# Patient Record
Sex: MALE | Race: WHITE | NOT HISPANIC OR LATINO | Employment: OTHER | URBAN - METROPOLITAN AREA
[De-identification: names, ages, dates, MRNs, and addresses within clinical notes are randomized per-mention and may not be internally consistent; named-entity substitution may affect disease eponyms.]

---

## 2017-12-18 ENCOUNTER — IMPORTED ENCOUNTER (OUTPATIENT)
Dept: URBAN - METROPOLITAN AREA CLINIC 43 | Facility: CLINIC | Age: 77
End: 2017-12-18

## 2017-12-18 PROBLEM — H25.13: Noted: 2017-12-18

## 2019-06-10 ENCOUNTER — IMPORTED ENCOUNTER (OUTPATIENT)
Dept: URBAN - METROPOLITAN AREA CLINIC 43 | Facility: CLINIC | Age: 79
End: 2019-06-10

## 2019-06-10 PROBLEM — H25.13: Noted: 2019-06-10

## 2019-06-10 PROBLEM — H11.123: Noted: 2019-06-10

## 2019-06-10 PROBLEM — H25.013: Noted: 2019-06-10

## 2019-06-10 PROBLEM — H25.12: Noted: 2019-06-10

## 2019-06-10 PROBLEM — H10.45: Noted: 2019-06-10

## 2019-06-10 PROBLEM — INACTIVE: Noted: 2019-06-10

## 2019-06-10 PROBLEM — H25.11: Noted: 2019-06-10

## 2019-06-17 ENCOUNTER — IMPORTED ENCOUNTER (OUTPATIENT)
Dept: URBAN - METROPOLITAN AREA CLINIC 43 | Facility: CLINIC | Age: 79
End: 2019-06-17

## 2020-04-18 ASSESSMENT — VISUAL ACUITY
OD_CC: J1+-1
OS_SC: J2
OS_OTHER: 20/40.
OS_CC: J1+-1
OS_SC: J1+
OS_SC: 20/30
OD_SC: J7
OS_SC: 20/50-1
OD_SC: 20/50-1
OD_SC: 20/70-1
OS_OTHER: <20/400.
OD_OTHER: <20/400.
OS_SC: 20/60-1
OD_OTHER: 20/40.
OS_PH: 20/40-2
OD_SC: 20/40
OD_SC: J2-1
OD_PH: 20/40-2

## 2020-04-18 ASSESSMENT — KERATOMETRY
OD_K2POWER_DIOPTERS: 44
OD_K2POWER_DIOPTERS: 43.5
OS_K2POWER_DIOPTERS: 44
OD_AXISANGLE2_DEGREES: 165
OD_K1POWER_DIOPTERS: 44.75
OS_K1POWER_DIOPTERS: 45.25
OS_K2POWER_DIOPTERS: 44.25
OD_AXISANGLE_DEGREES: 80
OS_K1POWER_DIOPTERS: 45.25
OS_AXISANGLE_DEGREES: 95
OS_AXISANGLE2_DEGREES: 10
OS_AXISANGLE2_DEGREES: 5
OD_AXISANGLE2_DEGREES: 170
OS_AXISANGLE_DEGREES: 100
OD_K1POWER_DIOPTERS: 45
OD_AXISANGLE_DEGREES: 75

## 2020-04-18 ASSESSMENT — TONOMETRY
OD_IOP_MMHG: 17.0
OS_IOP_MMHG: 19.0
OS_IOP_MMHG: 18.0
OD_IOP_MMHG: 20.0

## 2020-09-03 ENCOUNTER — PREPPED CHART (OUTPATIENT)
Dept: URBAN - METROPOLITAN AREA CLINIC 32 | Facility: CLINIC | Age: 80
End: 2020-09-03

## 2020-09-03 ASSESSMENT — KERATOMETRY
OD_K1POWER_DIOPTERS: 44.75
OS_K2POWER_DIOPTERS: 44
OS_AXISANGLE2_DEGREES: 5
OD_AXISANGLE_DEGREES: 75
OS_AXISANGLE_DEGREES: 95
OD_AXISANGLE2_DEGREES: 165
OD_K2POWER_DIOPTERS: 43.5
OS_K1POWER_DIOPTERS: 45.25

## 2020-09-09 ENCOUNTER — ESTABLISHED COMPREHENSIVE EXAM (OUTPATIENT)
Dept: URBAN - METROPOLITAN AREA CLINIC 32 | Facility: CLINIC | Age: 80
End: 2020-09-09

## 2020-09-09 DIAGNOSIS — H25.13: ICD-10-CM

## 2020-09-09 DIAGNOSIS — H25.043: ICD-10-CM

## 2020-09-09 DIAGNOSIS — H25.013: ICD-10-CM

## 2020-09-09 PROCEDURE — 92014 COMPRE OPH EXAM EST PT 1/>: CPT

## 2020-09-09 PROCEDURE — 92015 DETERMINE REFRACTIVE STATE: CPT

## 2020-09-09 ASSESSMENT — TONOMETRY
OD_IOP_MMHG: 12
OS_IOP_MMHG: 15

## 2020-09-09 ASSESSMENT — KERATOMETRY
OS_K2POWER_DIOPTERS: 44.25
OD_K2POWER_DIOPTERS: 43.75
OS_AXISANGLE2_DEGREES: 95
OD_AXISANGLE2_DEGREES: 71
OS_AXISANGLE_DEGREES: 5
OS_K1POWER_DIOPTERS: 45.5
OD_K1POWER_DIOPTERS: 45
OD_AXISANGLE_DEGREES: 161

## 2020-09-09 ASSESSMENT — VISUAL ACUITY
OS_CC: J3
OS_SC: J10
OS_SC: 20/100
OS_CC: 20/70
OD_CC: J3
OD_SC: 20/100
OD_CC: 20/70
OD_SC: J16

## 2020-12-10 ENCOUNTER — SURGICAL TESTING (OUTPATIENT)
Dept: URBAN - METROPOLITAN AREA CLINIC 32 | Facility: CLINIC | Age: 80
End: 2020-12-10

## 2020-12-10 DIAGNOSIS — H25.013: ICD-10-CM

## 2020-12-10 DIAGNOSIS — H25.043: ICD-10-CM

## 2020-12-10 DIAGNOSIS — H25.13: ICD-10-CM

## 2020-12-10 DIAGNOSIS — H18.513: ICD-10-CM

## 2020-12-10 DIAGNOSIS — H43.813: ICD-10-CM

## 2020-12-10 PROCEDURE — 92136TC INTERFEROMETRY - TECHNICAL COMPONENT

## 2020-12-10 PROCEDURE — 92014 COMPRE OPH EXAM EST PT 1/>: CPT

## 2020-12-10 PROCEDURE — 92286 ANT SGM IMG I&R SPECLR MIC: CPT

## 2020-12-10 PROCEDURE — 92134 CPTRZ OPH DX IMG PST SGM RTA: CPT

## 2020-12-10 PROCEDURE — 92025 CPTRIZED CORNEAL TOPOGRAPHY: CPT

## 2020-12-10 ASSESSMENT — KERATOMETRY
OD_K2POWER_DIOPTERS: 43.75
OS_AXISANGLE2_DEGREES: 95
OD_K1POWER_DIOPTERS: 45
OS_AXISANGLE_DEGREES: 5
OS_K1POWER_DIOPTERS: 45.5
OD_AXISANGLE2_DEGREES: 71
OS_K2POWER_DIOPTERS: 44.25
OD_AXISANGLE_DEGREES: 161

## 2020-12-10 ASSESSMENT — VISUAL ACUITY
OD_CC: J3
OS_GLARE: 20/400
OD_SC: 20/100
OS_SC: 20/100
OD_CC: 20/70
OS_SC: J10
OS_CC: J3
OD_SC: J16
OD_GLARE: 20/400
OS_CC: 20/70

## 2020-12-10 ASSESSMENT — TONOMETRY
OS_IOP_MMHG: 16
OD_IOP_MMHG: 16

## 2021-03-19 NOTE — PATIENT DISCUSSION
1.  Discussed the risks benefits alternatives and limitations of cataract surgery including infection bleeding loss of vision retinal tears detachment. The patient stated a full understanding and a desire to proceed with the procedure in both eyes. Refractive options were reviewed. Patient has elected to be optimized for distance vision in both eyes. The patient will still need glasses for reading and to possibly fine tune distance vision. Sched KPE/IOL OS/ODPO w/ ValentineDiscussed Toric/ MFIOL/EDOF2. Return for an appointment for [address] [place] [address] with Dr. Marlen Ortez.

## 2021-04-23 NOTE — PATIENT DISCUSSION
1.  Discussed the risks benefits alternatives and limitations of cataract surgery including infection bleeding loss of vision retinal tears detachment. The patient stated a full understanding and a desire to proceed with the procedure in both eyes. Refractive options were reviewed. Patient has elected to be optimized for distance vision in both eyes. The patient will still need glasses for reading and to possibly fine tune distance vision. 2. The patient has elected to have a Toric IOL to correct astigmatism in both eyes. Intraoperative ORA measurements and Femtolaser assist will be performed. The possible need for refractive enhancement was reviewed. There is no guarantee of perfect uncorrected acuity.

## 2021-09-20 NOTE — PATIENT DISCUSSION
Post-Op Cataract Surgery 15-90 days Both Eyes (OU)-  Doing well with stable vision. Eyhance improved intermediate vision. Readers for small print. Happy with vision.  No refractive surgery necessary Monitor for changesf/u Dr Shreyas Chirinos for routine care

## 2022-05-16 ENCOUNTER — COMPREHENSIVE EXAM (OUTPATIENT)
Dept: URBAN - METROPOLITAN AREA CLINIC 32 | Facility: CLINIC | Age: 82
End: 2022-05-16

## 2022-05-16 DIAGNOSIS — H25.13: ICD-10-CM

## 2022-05-16 DIAGNOSIS — H25.043: ICD-10-CM

## 2022-05-16 DIAGNOSIS — H25.013: ICD-10-CM

## 2022-05-16 DIAGNOSIS — H04.123: ICD-10-CM

## 2022-05-16 DIAGNOSIS — T15.82XA: ICD-10-CM

## 2022-05-16 PROCEDURE — 92015 DETERMINE REFRACTIVE STATE: CPT

## 2022-05-16 PROCEDURE — 99214 OFFICE O/P EST MOD 30 MIN: CPT

## 2022-05-16 ASSESSMENT — VISUAL ACUITY
OS_PH: 20/50
OD_SC: J5-1
OS_SC: CF 6FT
OS_SC: J1
OD_CC: 20/60-1
OD_SC: 20/50-1
OS_CC: CF 6FT

## 2022-05-16 ASSESSMENT — TONOMETRY
OD_IOP_MMHG: 13
OS_IOP_MMHG: 12

## 2022-05-16 ASSESSMENT — KERATOMETRY
OS_K2POWER_DIOPTERS: 44.25
OS_AXISANGLE_DEGREES: 5
OS_K1POWER_DIOPTERS: 45.5
OD_AXISANGLE2_DEGREES: 71
OD_AXISANGLE_DEGREES: 161
OD_K2POWER_DIOPTERS: 43.75
OS_AXISANGLE2_DEGREES: 95
OD_K1POWER_DIOPTERS: 45

## 2022-05-26 ENCOUNTER — DIAGNOSTICS ONLY (OUTPATIENT)
Dept: URBAN - METROPOLITAN AREA CLINIC 32 | Facility: CLINIC | Age: 82
End: 2022-05-26

## 2022-05-26 DIAGNOSIS — H43.812: ICD-10-CM

## 2022-05-26 DIAGNOSIS — H04.123: ICD-10-CM

## 2022-05-26 DIAGNOSIS — H25.13: ICD-10-CM

## 2022-05-26 DIAGNOSIS — H25.013: ICD-10-CM

## 2022-05-26 DIAGNOSIS — H25.043: ICD-10-CM

## 2022-05-26 PROCEDURE — 92286 ANT SGM IMG I&R SPECLR MIC: CPT

## 2022-05-26 PROCEDURE — 92025 CPTRIZED CORNEAL TOPOGRAPHY: CPT

## 2022-05-26 PROCEDURE — 92134 CPTRZ OPH DX IMG PST SGM RTA: CPT

## 2022-05-26 PROCEDURE — V2799PMN IMPRIMIS PRED-MOXI-NEPAF 5ML

## 2022-05-26 PROCEDURE — 99213 OFFICE O/P EST LOW 20 MIN: CPT

## 2022-05-26 PROCEDURE — 92136TC INTERFEROMETRY - TECHNICAL COMPONENT

## 2022-05-26 ASSESSMENT — VISUAL ACUITY
OD_SC: J5-1
OS_SC: CF 6FT
OD_CC: 20/50+1
OD_SC: 20/50
OD_GLARE: 20/400
OS_CC: J3
OD_CC: J3
OS_SC: J1
OS_CC: 20/400

## 2022-05-26 ASSESSMENT — KERATOMETRY
OD_AXISANGLE2_DEGREES: 71
OS_AXISANGLE_DEGREES: 5
OD_AXISANGLE_DEGREES: 161
OS_K1POWER_DIOPTERS: 45.5
OS_AXISANGLE2_DEGREES: 95
OD_K2POWER_DIOPTERS: 43.75
OS_K2POWER_DIOPTERS: 44.25
OD_K1POWER_DIOPTERS: 45

## 2022-06-04 ENCOUNTER — TELEPHONE ENCOUNTER (OUTPATIENT)
Dept: URBAN - METROPOLITAN AREA CLINIC 68 | Facility: CLINIC | Age: 82
End: 2022-06-04

## 2022-06-04 RX ORDER — PROPAFENONE 325 MG/1
PROPAFENONE HCL ER( 325MG ORAL  TWO A DAY ) INACTIVE -HX ENTRY CAPSULE, EXTENDED RELEASE ORAL
OUTPATIENT
Start: 2015-11-13

## 2022-06-04 RX ORDER — SODIUM SULFATE, POTASSIUM SULFATE, MAGNESIUM SULFATE 17.5; 3.13; 1.6 G/ML; G/ML; G/ML
SOLUTION, CONCENTRATE ORAL AS DIRECTED
Qty: 1 | Refills: 0 | OUTPATIENT
Start: 2018-12-13 | End: 2018-12-14

## 2022-06-04 RX ORDER — POLYETHYLENE GLYCOL 3350, SODIUM SULFATE, SODIUM CHLORIDE, POTASSIUM CHLORIDE, ASCORBIC ACID, SODIUM ASCORBATE 7.5-2.691G
KIT ORAL
Qty: 1 | Refills: 0 | OUTPATIENT
Start: 2015-03-02 | End: 2015-03-03

## 2022-06-05 ENCOUNTER — TELEPHONE ENCOUNTER (OUTPATIENT)
Dept: URBAN - METROPOLITAN AREA CLINIC 68 | Facility: CLINIC | Age: 82
End: 2022-06-05

## 2022-06-05 RX ORDER — METOPROLOL TARTRATE 50 MG/1
METOPROLOL TARTRATE( 50MG ORAL  DAILY ) ACTIVE -HX ENTRY TABLET, FILM COATED ORAL DAILY
Status: ACTIVE | COMMUNITY
Start: 2018-12-13

## 2022-06-07 ENCOUNTER — SURGERY/PROCEDURE (OUTPATIENT)
Dept: URBAN - METROPOLITAN AREA CLINIC 32 | Facility: CLINIC | Age: 82
End: 2022-06-07

## 2022-06-07 DIAGNOSIS — H25.11: ICD-10-CM

## 2022-06-07 PROCEDURE — 66984 XCAPSL CTRC RMVL W/O ECP: CPT

## 2022-06-07 ASSESSMENT — KERATOMETRY
OD_K1POWER_DIOPTERS: 45
OD_K2POWER_DIOPTERS: 43.75
OD_AXISANGLE_DEGREES: 161
OS_K2POWER_DIOPTERS: 44.25
OS_K1POWER_DIOPTERS: 45.5
OD_AXISANGLE2_DEGREES: 71
OS_AXISANGLE_DEGREES: 5
OS_AXISANGLE2_DEGREES: 95

## 2022-06-08 ENCOUNTER — POST-OP (OUTPATIENT)
Dept: URBAN - METROPOLITAN AREA CLINIC 32 | Facility: CLINIC | Age: 82
End: 2022-06-08

## 2022-06-08 DIAGNOSIS — Z96.1: ICD-10-CM

## 2022-06-08 DIAGNOSIS — H25.013: ICD-10-CM

## 2022-06-08 PROCEDURE — 99024 POSTOP FOLLOW-UP VISIT: CPT

## 2022-06-08 ASSESSMENT — TONOMETRY: OD_IOP_MMHG: 21

## 2022-06-08 ASSESSMENT — VISUAL ACUITY: OD_SC: 20/60+2

## 2022-06-10 ASSESSMENT — KERATOMETRY
OS_K1POWER_DIOPTERS: 45.5
OD_K1POWER_DIOPTERS: 45
OS_K2POWER_DIOPTERS: 44.25
OD_AXISANGLE2_DEGREES: 71
OD_K2POWER_DIOPTERS: 43.75
OD_AXISANGLE2_DEGREES: 71
OD_AXISANGLE_DEGREES: 161
OS_K1POWER_DIOPTERS: 45.5
OD_AXISANGLE_DEGREES: 161
OS_AXISANGLE2_DEGREES: 95
OS_AXISANGLE_DEGREES: 5
OS_K2POWER_DIOPTERS: 44.25
OS_AXISANGLE2_DEGREES: 95
OD_K2POWER_DIOPTERS: 43.75
OS_AXISANGLE_DEGREES: 5
OD_K1POWER_DIOPTERS: 45

## 2022-06-13 ENCOUNTER — POST-OP (OUTPATIENT)
Dept: URBAN - METROPOLITAN AREA CLINIC 32 | Facility: CLINIC | Age: 82
End: 2022-06-13

## 2022-06-13 DIAGNOSIS — H25.042: ICD-10-CM

## 2022-06-13 DIAGNOSIS — H25.12: ICD-10-CM

## 2022-06-13 DIAGNOSIS — Z96.1: ICD-10-CM

## 2022-06-13 PROCEDURE — 99212 OFFICE O/P EST SF 10 MIN: CPT

## 2022-06-13 PROCEDURE — V2799PMN IMPRIMIS PRED-MOXI-NEPAF 5ML

## 2022-06-13 ASSESSMENT — TONOMETRY: OD_IOP_MMHG: 12

## 2022-06-13 ASSESSMENT — VISUAL ACUITY: OD_SC: 20/25

## 2022-06-21 ENCOUNTER — SURGERY/PROCEDURE (OUTPATIENT)
Dept: URBAN - METROPOLITAN AREA CLINIC 32 | Facility: CLINIC | Age: 82
End: 2022-06-21

## 2022-06-21 DIAGNOSIS — H25.12: ICD-10-CM

## 2022-06-21 PROCEDURE — 66984 XCAPSL CTRC RMVL W/O ECP: CPT

## 2022-06-22 ENCOUNTER — POST-OP (OUTPATIENT)
Dept: URBAN - METROPOLITAN AREA CLINIC 32 | Facility: CLINIC | Age: 82
End: 2022-06-22

## 2022-06-22 DIAGNOSIS — Z96.1: ICD-10-CM

## 2022-06-22 PROCEDURE — 99024 POSTOP FOLLOW-UP VISIT: CPT

## 2022-06-22 ASSESSMENT — KERATOMETRY
OS_K2POWER_DIOPTERS: 44.25
OS_AXISANGLE2_DEGREES: 95
OD_AXISANGLE2_DEGREES: 71
OD_AXISANGLE_DEGREES: 161
OS_K1POWER_DIOPTERS: 45.5
OD_K1POWER_DIOPTERS: 45
OD_K2POWER_DIOPTERS: 43.75
OS_AXISANGLE_DEGREES: 5

## 2022-06-22 ASSESSMENT — VISUAL ACUITY: OS_SC: 20/30+2

## 2022-06-22 ASSESSMENT — TONOMETRY: OS_IOP_MMHG: 22

## 2022-06-24 ASSESSMENT — KERATOMETRY
OS_K2POWER_DIOPTERS: 44.25
OD_K1POWER_DIOPTERS: 45
OD_AXISANGLE_DEGREES: 161
OS_AXISANGLE2_DEGREES: 95
OS_K1POWER_DIOPTERS: 45.5
OD_K2POWER_DIOPTERS: 43.75
OD_AXISANGLE2_DEGREES: 71
OS_AXISANGLE_DEGREES: 5

## 2022-06-25 ENCOUNTER — TELEPHONE ENCOUNTER (OUTPATIENT)
Age: 82
End: 2022-06-25

## 2022-06-25 RX ORDER — POLYETHYLENE GLYCOL 3350, SODIUM SULFATE, SODIUM CHLORIDE, POTASSIUM CHLORIDE, ASCORBIC ACID, SODIUM ASCORBATE 7.5-2.691G
KIT ORAL
Qty: 1 | Refills: 0 | OUTPATIENT
Start: 2015-03-02 | End: 2015-03-03

## 2022-06-25 RX ORDER — PROPAFENONE 325 MG/1
PROPAFENONE HCL ER( 325MG ORAL  TWO A DAY ) INACTIVE -HX ENTRY CAPSULE, EXTENDED RELEASE ORAL
OUTPATIENT
Start: 2015-11-13

## 2022-06-25 RX ORDER — CHLORHEXIDINE GLUCONATE 4 %
VITAMIN B 12(    DAILY ) INACTIVE -HX ENTRY LIQUID (ML) TOPICAL DAILY
OUTPATIENT
Start: 2018-12-13

## 2022-06-25 RX ORDER — ASPIRIN 81 MG/1
BABY ASPIRIN( 81MG ORAL  DAILY ) INACTIVE -HX ENTRY TABLET, COATED ORAL DAILY
OUTPATIENT
Start: 2015-11-13

## 2022-06-25 RX ORDER — SODIUM SULFATE, POTASSIUM SULFATE, MAGNESIUM SULFATE 17.5; 3.13; 1.6 G/ML; G/ML; G/ML
SOLUTION, CONCENTRATE ORAL AS DIRECTED
Qty: 1 | Refills: 0 | OUTPATIENT
Start: 2018-12-13 | End: 2018-12-14

## 2022-06-26 ENCOUNTER — TELEPHONE ENCOUNTER (OUTPATIENT)
Age: 82
End: 2022-06-26

## 2022-06-26 RX ORDER — METOPROLOL TARTRATE 50 MG/1
METOPROLOL TARTRATE( 50MG ORAL  DAILY ) ACTIVE -HX ENTRY TABLET, FILM COATED ORAL DAILY
Status: ACTIVE | COMMUNITY
Start: 2018-12-13

## 2022-06-26 RX ORDER — OMEGA-3 FATTY ACIDS/FISH OIL 360-1200MG
SUPER B COMPLEX(    DAILY ) ACTIVE -HX ENTRY CAPSULE ORAL DAILY
Status: ACTIVE | COMMUNITY
Start: 2018-12-13

## 2022-06-26 RX ORDER — CALCIUM CARBONATE/VITAMIN D3 600 MG-10
CALCIUM(   200MG DAILY ) ACTIVE -HX ENTRY TABLET ORAL DAILY
Status: ACTIVE | COMMUNITY
Start: 2018-12-13

## 2022-06-27 ASSESSMENT — KERATOMETRY
OS_AXISANGLE_DEGREES: 5
OS_AXISANGLE2_DEGREES: 95
OS_K1POWER_DIOPTERS: 45.5
OD_K1POWER_DIOPTERS: 45
OD_K2POWER_DIOPTERS: 43.75
OD_AXISANGLE2_DEGREES: 71
OD_AXISANGLE_DEGREES: 161
OS_K2POWER_DIOPTERS: 44.25

## 2022-06-28 ENCOUNTER — POST-OP (OUTPATIENT)
Dept: URBAN - METROPOLITAN AREA CLINIC 32 | Facility: CLINIC | Age: 82
End: 2022-06-28

## 2022-06-28 DIAGNOSIS — Z96.1: ICD-10-CM

## 2022-06-28 DIAGNOSIS — H25.013: ICD-10-CM

## 2022-06-28 PROCEDURE — 99024 POSTOP FOLLOW-UP VISIT: CPT

## 2022-06-28 ASSESSMENT — VISUAL ACUITY: OS_SC: 20/30-2

## 2022-06-28 ASSESSMENT — TONOMETRY: OS_IOP_MMHG: 14

## 2022-07-28 ENCOUNTER — POST-OP (OUTPATIENT)
Dept: URBAN - METROPOLITAN AREA CLINIC 32 | Facility: CLINIC | Age: 82
End: 2022-07-28

## 2022-07-28 DIAGNOSIS — Z96.1: ICD-10-CM

## 2022-07-28 PROCEDURE — 99024 POSTOP FOLLOW-UP VISIT: CPT

## 2022-07-28 ASSESSMENT — KERATOMETRY
OS_AXISANGLE2_DEGREES: 95
OD_K1POWER_DIOPTERS: 45
OS_K1POWER_DIOPTERS: 45.5
OD_AXISANGLE2_DEGREES: 71
OS_K2POWER_DIOPTERS: 44.25
OS_AXISANGLE_DEGREES: 5
OD_AXISANGLE_DEGREES: 161
OD_K2POWER_DIOPTERS: 43.75

## 2022-07-28 ASSESSMENT — VISUAL ACUITY
OS_SC: 20/25+2
OS_SC: J1
OD_SC: J2
OD_SC: 20/25-2

## 2022-07-28 ASSESSMENT — TONOMETRY
OS_IOP_MMHG: 13
OD_IOP_MMHG: 14

## 2023-05-18 ENCOUNTER — EMERGENCY VISIT (OUTPATIENT)
Dept: URBAN - METROPOLITAN AREA CLINIC 32 | Facility: CLINIC | Age: 83
End: 2023-05-18

## 2023-05-18 DIAGNOSIS — H25.013: ICD-10-CM

## 2023-05-18 DIAGNOSIS — H26.493: ICD-10-CM

## 2023-05-18 DIAGNOSIS — H11.32: ICD-10-CM

## 2023-05-18 PROCEDURE — 99213 OFFICE O/P EST LOW 20 MIN: CPT

## 2023-05-18 PROCEDURE — 92285 EXTERNAL OCULAR PHOTOGRAPHY: CPT

## 2023-05-18 ASSESSMENT — KERATOMETRY
OD_K1POWER_DIOPTERS: 45
OS_AXISANGLE_DEGREES: 5
OS_AXISANGLE2_DEGREES: 95
OS_K2POWER_DIOPTERS: 44.25
OD_AXISANGLE2_DEGREES: 71
OD_AXISANGLE_DEGREES: 161
OS_K1POWER_DIOPTERS: 45.5
OD_K2POWER_DIOPTERS: 43.75

## 2023-05-18 ASSESSMENT — VISUAL ACUITY
OS_SC: 20/40
OD_SC: 20/30

## 2023-05-18 ASSESSMENT — TONOMETRY
OS_IOP_MMHG: 11
OD_IOP_MMHG: 11

## 2024-07-15 ENCOUNTER — COMPREHENSIVE EXAM (OUTPATIENT)
Dept: URBAN - METROPOLITAN AREA CLINIC 32 | Facility: CLINIC | Age: 84
End: 2024-07-15

## 2024-07-15 DIAGNOSIS — H16.223: ICD-10-CM

## 2024-07-15 DIAGNOSIS — H26.493: ICD-10-CM

## 2024-07-15 DIAGNOSIS — H57.13: ICD-10-CM

## 2024-07-15 DIAGNOSIS — H43.812: ICD-10-CM

## 2024-07-15 DIAGNOSIS — H43.813: ICD-10-CM

## 2024-07-15 PROCEDURE — 92015 DETERMINE REFRACTIVE STATE: CPT

## 2024-07-15 PROCEDURE — 99214 OFFICE O/P EST MOD 30 MIN: CPT

## 2024-07-15 ASSESSMENT — KERATOMETRY
OD_K2POWER_DIOPTERS: 43.75
OD_AXISANGLE2_DEGREES: 71
OS_K2POWER_DIOPTERS: 44.25
OD_AXISANGLE_DEGREES: 161
OD_K1POWER_DIOPTERS: 45
OS_AXISANGLE_DEGREES: 5
OS_K1POWER_DIOPTERS: 45.5
OS_AXISANGLE2_DEGREES: 95

## 2024-07-15 ASSESSMENT — TONOMETRY
OS_IOP_MMHG: 15
OD_IOP_MMHG: 12

## 2024-07-17 ASSESSMENT — VISUAL ACUITY
OS_SC: 20/30
OS_GLARE: 20/60
OS_SC: J5
OD_GLARE: 20/60
OD_SC: J3
OD_SC: 20/30

## 2024-07-29 ENCOUNTER — SURGERY/PROCEDURE (OUTPATIENT)
Facility: LOCATION | Age: 84
End: 2024-07-29

## 2024-07-29 DIAGNOSIS — H26.491: ICD-10-CM

## 2024-07-29 PROCEDURE — 66821 AFTER CATARACT LASER SURGERY: CPT

## 2024-08-01 ASSESSMENT — KERATOMETRY
OD_AXISANGLE2_DEGREES: 71
OS_K1POWER_DIOPTERS: 45.5
OD_AXISANGLE_DEGREES: 161
OS_K2POWER_DIOPTERS: 44.25
OD_K2POWER_DIOPTERS: 43.75
OS_AXISANGLE_DEGREES: 5
OD_K1POWER_DIOPTERS: 45
OS_AXISANGLE2_DEGREES: 95

## 2024-08-19 ENCOUNTER — SURGERY/PROCEDURE (OUTPATIENT)
Facility: LOCATION | Age: 84
End: 2024-08-19

## 2024-08-19 DIAGNOSIS — H26.492: ICD-10-CM

## 2024-08-19 PROCEDURE — 66821 AFTER CATARACT LASER SURGERY: CPT | Mod: 79,LT

## 2024-08-21 ASSESSMENT — KERATOMETRY
OS_AXISANGLE_DEGREES: 5
OD_K1POWER_DIOPTERS: 45
OS_K2POWER_DIOPTERS: 44.25
OS_K1POWER_DIOPTERS: 45.5
OD_K2POWER_DIOPTERS: 43.75
OD_AXISANGLE2_DEGREES: 71
OS_AXISANGLE2_DEGREES: 95
OD_AXISANGLE_DEGREES: 161